# Patient Record
(demographics unavailable — no encounter records)

---

## 2024-11-26 NOTE — THERAPY
[Today's Date] : [unfilled] [Humalog] : Humalog [_____] :  [unfilled] units/hour [BG Target = ____] : BG Target = [unfilled] [Insulin Sensitivity Factor = ____] : Insulin Sensitivity Factor = [unfilled] [FreeTextEntry3] : I:C 12am 15 7am 8 2p 10 [FreeTextEntry2] : PLEASE ADMINISTER INSULIN PREMEAL

## 2024-11-26 NOTE — PAST MEDICAL HISTORY
[At Term] : at term [ Section] : by  section [Age Appropriate] : age appropriate developmental milestones met [de-identified] : transverse lie [FreeTextEntry4] : NICU x2.5 weeks, cyanosis, persistent fetal circulation, intubated, chest tube, collapsed lung

## 2024-11-26 NOTE — PHYSICAL EXAM
[Healthy Appearing] : healthy appearing [Well Nourished] : well nourished [Interactive] : interactive [Normal Appearance] : normal appearance [WNL for age] : within normal limits of age [Normal S1 and S2] : normal S1 and S2 [Clear to Ausculation Bilaterally] : clear to auscultation bilaterally [Abdomen Soft] : soft [Abdomen Tenderness] : non-tender [Normal] : normal  [Mild Lipohypertrophy of Arms] : no mild lipohypertrophy lateral aspects of arms [Goiter] : no goiter [Murmur] : no murmurs [de-identified] : weight gain 7kg/3m,, GV ~8cm/yr [de-identified] : mild acne forehead [de-identified] : eyeglasses [de-identified] : normal oropharynx [de-identified] : normal patellar DTRs

## 2024-11-26 NOTE — DATA REVIEWED
[FreeTextEntry1] : Labs from admission:\par  1/24/23 HbA1C 10.1% CPeptide 1.1 TSH 2.29 AntiTPO 12.5 AntiTG <20 LDL 72 HDL 34 (low) TG 89 IgA 97 TTG IgA <1.2  \par  AntiGAD 0.27 (high) AntiZnT8 <15 ICA 1:64 (high) Insulin Ab 27 (high)

## 2024-11-26 NOTE — DISCUSSION/SUMMARY
[FreeTextEntry1] : Ngozi has T1DM, fairly good control on CGM and insulin pump closed loop.  Higher BGs after meals, then often stays high.  Increased morning/midday I:C and SF, increased all basal rates.  In addition to multiple daily self-monitorings of BG, HgbA1c will be measured about every 3-4 months as an index of chronic glycemic control. TFTs should be measured annually, and celiac profile screened initially, and then repeated if symptoms develop in the future.  Lipid screening begins at age 10-11 year and then 5 yearly if normal.  Within 5 years of diagnosis, she should also have an annual eye examination and urine checked for microalbuminuria. I emphasized the importance of strict glycemic control, while avoiding hypoglycemia, for avoidance of long-term microvascular complications of diabetes. I reviewed the desirable target BG & HgbA1c range and the importance of diet and exercise in determining proper insulin doses. I explained that if the BG is consistently outside the target range, he needs to call the Diabetes Team to make dose adjustments.

## 2024-11-26 NOTE — REVIEW OF SYSTEMS
[Nl] : Neurological [Decrease In Appetite] : decreased appetite [Abdominal Pain] : abdominal pain [Constipation] : constipation [Change in Activity] : no change in activity [Vomiting] : no vomiting [Diarrhea] : no diarrhea [Urinary Frequency] : no urinary frequency

## 2024-11-26 NOTE — CONSULT LETTER
[Dear  ___] : Dear  [unfilled], [Courtesy Letter:] : I had the pleasure of seeing your patient, [unfilled], in my office today. [Please see my note below.] : Please see my note below. [Consult Closing:] : Thank you very much for allowing me to participate in the care of this patient.  If you have any questions, please do not hesitate to contact me. [Sincerely,] : Sincerely, [FreeTextEntry3] : Bertha Patel MD\par  Director, Pediatric Endocrinology\par  [facility], [facility]\par   of Pediatrics \par  [facility] [employer] [facility]

## 2024-11-26 NOTE — HISTORY OF PRESENT ILLNESS
[Other: ___] :  blood sugar levels are tested [unfilled] times per day [Abdomen] : abdomen [Buttocks] : buttocks [Glucagon at Home] : has glucagon at home [Premenarchal] : premenarchal [2-3] : the pump insertion site is changed every 2 - 3 days [Previous Hypoglycemic Seizure] : has no history of hypoglycemic seizure [FreeTextEntry2] : Ngozi is a 10y11m F with T1DM diagnosed 1/2023, no DKA.  Adjusting well.  On dexcom since 2/2023, Omnipod 5 pump closed loop 4/12/23,. Managing her DM with supervision, knows how to do pod changes, not yet dexcom. Started middle school, will no longer have a para.  Mom does note that she has been "sneaking" candy.  Last visit increased all doses.  No intercurrent illness.  Having some nausea, sometimes when wakes up, but sometimes after eats.  Accompanied also by abdominal pain around umbilical area, and gas.  Less constipation. Saw GI, taking pepcid with partial improvement.  Having some anxiety re DM not going away, seeing therapist weekly.    Early normal pubertal development.  More acne.  Acne wash using once daily.  Not yet menarche.  Orthodontist - braces    Podiatry - s/p paronychia.    -Blood Sugar Testing Pattern: on Dexcom G 6 on February 9 and Omnipod 5 on 4/12/2023 -Insulin Given By: patient, state she feels comfortable with carb counting -Missed Shots:  none -Times of Hyperglycemia: mom states patient eats a lot of candy at school and not covering mom states she also is sneaking food at night pt states she goes to sleep at midnight. Stays high after meals; bolusing as she's starting to eating, as per Ngozi she feels nauseous and has a headache with high BS, sometimes will get dizzy.  -Times of Hypoglycemia: improved mom said it's rare but have happened once or twice in the past 3 months; patient states she gets loopy,  hungry, shaky,  treats with 4 oz juice (13-15 carbs) and a few skittles; glucose tabs; as per mom started using activity mode; as per Ngozi she feels dizzy; can't dizzy  -Parental Part in Care: parents have full control of diabetes Management, mom reports that she now puts in pump by herself and learning how to place CGM. She will not be having a para this year. Patient has watch where she can see her BG, patient can count carbs but will ask MOC if she's not sure.  -Recent Hospitalizations: none -Recent Illnesses: stomach issues , mom said they went to Dr. Woods who prescribed pepcid for nausea and stomach aches -Pump Specific: Started on OMNIPOD 5 on 4/12/2023; very pleased with pump -Activity Level: very active in Theatre with dancing; vocal lessons, piano and singing play softball; roller skating; rarely goes low during activity; mom sets to activity mode -Diabetes Education Topics Covered:  reviewed the importance of testing blood sugar; calculation of carb coverage; covering carbs eating; effect of increased physical activity on blood sugar; checking ketones; sick day guidelines; reviewed acute and chronic complications related to diabetes; reviewed the meaning and importance of HgbA1C  - Physician review of data: 2 weeks of CGM and pump download reviewed, 47% time in range, higher after meals anil B/L, and once high midday does not come down sufficiently  OTHER: -Eye exam: 10/24 -Dental: 05/2024, has appt in January 2025 -Podiatry for paronychia 9/2023, doesn't see podiatrist anymore -Labs: 02/03/2024 -Flu Vaccine:  declined, has not yet received pneumococcal - provided information sheet -Covid Vaccine:  declined -PMD: has appt on 12/24  -CDE: 2/9/2023 ;  -Nutrition:  7/11/2023 -Medic Bracelet : wearing bracelet  Current Regimen:  On Omipod 5 as of 4/12/2023:  Basal Rate:  12AM 0.45 units/hour 12PM 0.55 units/hour  ISF: 70 I:C 12AM= 1: 15 I:C= 7:AM=11.5   Target:120 IOB: 3 hours [FreeTextEntry1] : Dexcom G6 on the back of arms; pump on the other arm  No issues with line of site; parents rotate sites

## 2025-02-05 NOTE — HISTORY OF PRESENT ILLNESS
[de-identified] : 10 year old female with Type I DM is here with abdominal pain, nausea and changes in BM. Was seen by [facility] GI and diagnosed with lactose intolerance as per mom. BM is soft and formed but occasionally hard. Denies blood or mucus. Denies emesis but has nausea. Reports to have balanced diet. Denies nocturnal awakenings, unintentional weight loss, rash, joint pain, oral ulcers, vision changes, fever, sick contacts or recent travels.  Did better on pepcid and now weaned off stopped therapy

## 2025-02-25 NOTE — DATA REVIEWED
[FreeTextEntry1] : Labs 11/27/24 CBC nl CMP nl Lipids ok  HbA1C 8.1% LINCOLN/Cr 5 TSH 1.2 T4 6.1 AntiTG <1 AntiTPO 10 IgA 110 TTG IgA<2

## 2025-02-25 NOTE — THERAPY
[Today's Date] : [unfilled] [Humalog] : Humalog [_____] :  [unfilled] units/hour [BG Target = ____] : BG Target = [unfilled] [Insulin Sensitivity Factor = ____] : Insulin Sensitivity Factor = [unfilled] [Insulin on Board (IOB) Duration = ____ hours] : Insulin on Board (IOB) Duration  = [unfilled] hours [FreeTextEntry3] : I:C 12am 15 6am 7 11am 8 2p 10 6p 8 9p 10 [FreeTextEntry2] : PLEASE ADMINISTER INSULIN PREMEAL

## 2025-02-25 NOTE — DISCUSSION/SUMMARY
[FreeTextEntry1] : Ngozi has T1DM, fairl control on CGM and insulin pump closed loop, increased A1C at this time.  Higher insulin requirements likely a reflection of puberty.  Increased basal rates, I:Cs, target, and afternoon SF. Monitoring labs normal.  Additionally increased weight gain.  Referred for nutrition consult.  In addition to multiple daily self-monitorings of BG, HgbA1c will be measured about every 3-4 months as an index of chronic glycemic control. TFTs should be measured annually, and celiac profile screened initially, and then repeated if symptoms develop in the future.  Lipid screening begins at age 10-11 year and then 5 yearly if normal.  Within 5 years of diagnosis, she should also have an annual eye examination and urine checked for microalbuminuria. I emphasized the importance of strict glycemic control, while avoiding hypoglycemia, for avoidance of long-term microvascular complications of diabetes. I reviewed the desirable target BG & HgbA1c range and the importance of diet and exercise in determining proper insulin doses. I explained that if the BG is consistently outside the target range, he needs to call the Diabetes Team to make dose adjustments.

## 2025-02-25 NOTE — HISTORY OF PRESENT ILLNESS
[Other: ___] :  blood sugar levels are tested [unfilled] times per day [2-3] : the pump insertion site is changed every 2 - 3 days [Abdomen] : abdomen [Buttocks] : buttocks [Glucagon at Home] : has glucagon at home [Premenarchal] : premenarchal [Legs] : legs [Previous Hypoglycemic Seizure] : has no history of hypoglycemic seizure [FreeTextEntry2] : Ngozi is a 11y2m F with T1DM diagnosed 1/2023, no DKA.  On dexcom since 2/2023, Omnipod 5 pump closed loop 4/12/23, Managing her DM with supervision, knows how to do pod changes, not yet dexcom on her own. In middle school. No longer eating candy.  Last visit Increased morning/midday I:C and SF, increased all basal rates.  s/p COVID 2 weeks ago.  Currently with a stye. No longer having GI issues.  Main concern at this time is weight gain, increased appetite. Very active.  Having some anxiety re DM not going away, seeing therapist weekly.  Early normal pubertal development.  More acne.  Acne wash using once daily.  Not yet menarche.  Notes increased appetite.  Orthodontist - braces    Podiatry - s/p paronychia.    -Blood Sugar Testing Pattern: Omnipod 5 with Dexcom G6 -Insulin Given By: patient, state she feels comfortable with carb counting -Missed Shots:  none -Times of Hyperglycemia: mom states patient eats sometimes without her knowledge.  Stays high after meals sometimes and is trying for most part to bolus before meals or at the start of meals; as per Ngozi she feels nauseous and has a headache with high BS, sometimes will get dizzy. Gained 15 lbs since last visit in November.  -Times of Hypoglycemia: not often; patient states she gets loopy, hungry, shaky, treats with 4 oz juice (13-15 carbs) and a few skittles; glucose tabs; as per mom started using activity mode; as per Ngozi she feels dizzy; can't dizzy  -Parental Part in Care: parents have full control of diabetes Management, mom reports that she now puts in pump by herself and learning how to place CGM. She will not be having a para this year. Patient has watch where she can see her BG, patient can count carbs but will ask MOC if she's not sure. Patient is gaining independence.  -Recent Hospitalizations: none -Recent Illnesses: COVID two weeks ago; slight cold; stye in right eye; mom and patient report that stomach issues have resolved.  -Pump Specific: Started on OMNIPOD 5 on 4/12/2023; very pleased with pump; changing sites every 2 days; insulin in pump humalog; uses Activity Mode if very active, swimming.  -Activity Level: very active in Theatre with dancing; vocal lessons, piano and singing play softball; archery, dancing, roller skating; rarely goes low during activity; mom sets to activity mode; -Diabetes Education Topics Covered:  reviewed the importance of testing blood sugar; calculation of carb coverage; covering carbs eating; effect of increased physical activity on blood sugar; checking ketones; sick day guidelines; reviewed acute and chronic complications related to diabetes; reviewed the meaning and importance of HgbA1C  - Physician review of data: 2 weeks of CGM and pump download reviewed, 53% time in range, higher after meals anil B/D, once high midday does not come down sufficiently  OTHER: -Eye exam: 10/24-prescription changed -Dental: 05/2024, needs cleaning due now -Podiatry for paronychia 9/2023, doesn't see podiatrist anymore -Labs:02/2025 -Flu Vaccine:  declined, has not yet received pneumococcal - provided information sheet -Covid Vaccine:  declined -PMD: had appt on 12/24  -CDE: 2/9/2023 ;  -Nutrition:  7/11/2023 -Medic Bracelet: wearing bracelet  Current Regimen:  On Omipod 5 as of 4/12/2023:  Basal Rate:  12AM 0.9 units/hour 12PM 1.0 units/hour  ISF: 12am=60 7am=55 7pm=60 I:C 12AM= 1: 15; 7am=8; 2pm=10  Target:120 (correction threshold 130) IOB: 3 hours [FreeTextEntry1] : Dexcom G6 on the back of arms; pump on the other arm  No issues with line of site; parents rotate sites

## 2025-02-25 NOTE — PHYSICAL EXAM
[Healthy Appearing] : healthy appearing [Well Nourished] : well nourished [Interactive] : interactive [WNL for age] : within normal limits of age [Normal S1 and S2] : normal S1 and S2 [Clear to Ausculation Bilaterally] : clear to auscultation bilaterally [Abdomen Soft] : soft [Abdomen Tenderness] : non-tender [Normal] : normal  [Overweight] : overweight [Mild Lipohypertrophy of Arms] : no mild lipohypertrophy lateral aspects of arms [Goiter] : no goiter [Murmur] : no murmurs [de-identified] : weight gain 6.7kg/3m, GV 8.4cm/yr [de-identified] : minimal acne forehead [de-identified] : eyeglasses, stye R upper lid laterally [de-identified] : normal oropharynx [de-identified] : normal patellar DTRs

## 2025-02-25 NOTE — CONSULT LETTER
[Dear  ___] : Dear  [unfilled], [Courtesy Letter:] : I had the pleasure of seeing your patient, [unfilled], in my office today. [Please see my note below.] : Please see my note below. [Consult Closing:] : Thank you very much for allowing me to participate in the care of this patient.  If you have any questions, please do not hesitate to contact me. [Sincerely,] : Sincerely, [FreeTextEntry3] : Bertha Patel MD\par  Director, Pediatric Endocrinology\par  [facility], [facility]\par   of Pediatrics \par  [facility] [employer] at [facility]

## 2025-02-25 NOTE — PAST MEDICAL HISTORY
[At Term] : at term [ Section] : by  section [Age Appropriate] : age appropriate developmental milestones met [de-identified] : transverse lie [FreeTextEntry4] : NICU x2.5 weeks, cyanosis, persistent fetal circulation, intubated, chest tube, collapsed lung

## 2025-03-26 NOTE — PHYSICAL EXAM
[Healthy Appearing] : healthy appearing [Well Nourished] : well nourished [Interactive] : interactive [Overweight] : overweight [WNL for age] : within normal limits of age [Normal S1 and S2] : normal S1 and S2 [Murmur] : no murmurs [Clear to Ausculation Bilaterally] : clear to auscultation bilaterally [Abdomen Soft] : soft [Abdomen Tenderness] : non-tender [Normal] : normal  [de-identified] : normal oropharynx [Normal Appearance] : normal appearance [Mild Lipohypertrophy of Arms] : no mild lipohypertrophy lateral aspects of arms [Goiter] : no goiter [de-identified] : weight gain 2.6kg/1m [de-identified] : minimal acne forehead [de-identified] : eyeglasses [de-identified] : nonfocal

## 2025-03-26 NOTE — PAST MEDICAL HISTORY
[At Term] : at term [ Section] : by  section [Age Appropriate] : age appropriate developmental milestones met [de-identified] : transverse lie [FreeTextEntry4] : NICU x2.5 weeks, cyanosis, persistent fetal circulation, intubated, chest tube, collapsed lung

## 2025-03-26 NOTE — CONSULT LETTER
[Dear  ___] : Dear  [unfilled], [Courtesy Letter:] : I had the pleasure of seeing your patient, [unfilled], in my office today. [Please see my note below.] : Please see my note below. [Consult Closing:] : Thank you very much for allowing me to participate in the care of this patient.  If you have any questions, please do not hesitate to contact me. [Sincerely,] : Sincerely, [FreeTextEntry3] : Bertha Patel MD\par  Director, Pediatric Endocrinology\par  [facility], [facility]\par   of Pediatrics \par  [facility] [employer] at [facility]

## 2025-03-26 NOTE — PHYSICAL EXAM
[Healthy Appearing] : healthy appearing [Well Nourished] : well nourished [Interactive] : interactive [Overweight] : overweight [WNL for age] : within normal limits of age [Normal S1 and S2] : normal S1 and S2 [Murmur] : no murmurs [Clear to Ausculation Bilaterally] : clear to auscultation bilaterally [Abdomen Soft] : soft [Abdomen Tenderness] : non-tender [Normal] : normal  [de-identified] : normal oropharynx [Normal Appearance] : normal appearance [Mild Lipohypertrophy of Arms] : no mild lipohypertrophy lateral aspects of arms [Goiter] : no goiter [de-identified] : weight gain 2.6kg/1m [de-identified] : minimal acne forehead [de-identified] : eyeglasses [de-identified] : nonfocal

## 2025-03-26 NOTE — HISTORY OF PRESENT ILLNESS
[Other: ___] :  blood sugar levels are tested [unfilled] times per day [2-3] : the pump insertion site is changed every 2 - 3 days [Legs] : legs [Abdomen] : abdomen [Buttocks] : buttocks [Glucagon at Home] : has glucagon at home [Premenarchal] : premenarchal [Previous Hypoglycemic Seizure] : has no history of hypoglycemic seizure [FreeTextEntry2] : Ngozi is a 11y3m F with T1DM diagnosed 1/2023, no DKA.  On dexcom since 2/2023, Omnipod 5 pump closed loop 4/12/23, managing her DM with supervision, knows how to do pod changes, not yet dexcom on her own. In middle school. No longer eating candy but admits to routinely. Main concern is weight gain, increased appetite. Very active.  Last visit increased basal rates, I:Cs, target, and afternoon SF. Returns today to meet with nutritionist.  Having some anxiety re DM, seeing therapist weekly.  Early normal pubertal development.  More acne.  Acne wash using once daily.  Not yet menarche.  Notes increased appetite.  Orthodontist - braces    Podiatry - s/p paronychia.    -Blood Sugar Testing Pattern: Omnipod 5 with Dexcom G6 -Insulin Given By: patient, state she feels comfortable with carb counting -Missed Shots:  none -Times of Hyperglycemia: mom states patient eats sometimes without her knowledge.  Stays high after meals sometimes and is trying for most part to bolus before meals or at the start of meals; as per Ngozi she feels nauseous and has a headache with high BS, sometimes will get dizzy. Gained 15 lbs since last visit in November.  -Times of Hypoglycemia: not often; patient states she gets loopy, hungry, shaky, treats with 4 oz juice (13-15 carbs) and a few skittles; glucose tabs; as per mom started using activity mode; as per Ngozi she feels dizzy; can't dizzy  -Parental Part in Care: parents have full control of diabetes Management, mom reports that she now puts in pump by herself and learning how to place CGM. She will not be having a para this year. Patient has watch where she can see her BG, patient can count carbs but will ask MOC if she's not sure. Patient is gaining independence.  -Recent Hospitalizations: none -Recent Illnesses: COVID two weeks ago; slight cold; stye in right eye; mom and patient report that stomach issues have resolved.  -Pump Specific: Started on OMNIPOD 5 on 4/12/2023; very pleased with pump; changing sites every 2 days; insulin in pump humalog; uses Activity Mode if very active, swimming.  -Activity Level: very active in Theatre with dancing; vocal lessons, piano and singing play softball; archery, dancing, roller skating; rarely goes low during activity; mom sets to activity mode; -Diabetes Education Topics Covered:  reviewed the importance of testing blood sugar; calculation of carb coverage; covering carbs eating; effect of increased physical activity on blood sugar; checking ketones; sick day guidelines; reviewed acute and chronic complications related to diabetes; reviewed the meaning and importance of HgbA1C  - Physician review of data: 2 weeks of CGM and pump download reviewed, 53% time in range, higher after meals anil B/D, once high midday does not come down sufficiently  OTHER: -Eye exam: 10/24-prescription changed -Dental: 05/2024, needs cleaning due now -Podiatry for paronychia 9/2023, doesn't see podiatrist anymore -Labs:02/2025 -Flu Vaccine:  declined, has not yet received pneumococcal - provided information sheet -Covid Vaccine:  declined -PMD: had appt on 12/24  -CDE: 2/9/2023 ;  -Nutrition:  today 3/25/25 -Medic Bracelet: wearing bracelet  Current Regimen:  On Omipod 5 as of 4/12/2023:  Basal Rate:  12AM 0.9 units/hour 12PM 1.0 units/hour  ISF: 12am=60 7am=55 7pm=60 I:C 12AM= 1: 15; 7am=8; 2pm=10  Target:120 (correction threshold 130) IOB: 3 hours [FreeTextEntry1] : Dexcom G6 on the back of arms; pump on the other arm  No issues with line of site; parents rotate sites

## 2025-03-26 NOTE — PAST MEDICAL HISTORY
[At Term] : at term [ Section] : by  section [Age Appropriate] : age appropriate developmental milestones met [de-identified] : transverse lie [FreeTextEntry4] : NICU x2.5 weeks, cyanosis, persistent fetal circulation, intubated, chest tube, collapsed lung

## 2025-03-26 NOTE — CONSULT LETTER
[Dear  ___] : Dear  [unfilled], [Courtesy Letter:] : I had the pleasure of seeing your patient, [unfilled], in my office today. [Please see my note below.] : Please see my note below. [Consult Closing:] : Thank you very much for allowing me to participate in the care of this patient.  If you have any questions, please do not hesitate to contact me. [Sincerely,] : Sincerely, [FreeTextEntry3] : Bertha Patel MD\par  Director, Pediatric Endocrinology\par  [facility], [facility]\par   of Pediatrics \par  [facility] [employer] [facility] at [facility]

## 2025-03-26 NOTE — DISCUSSION/SUMMARY
[FreeTextEntry1] : Ngozi has T1DM, fair control on CGM and insulin pump closed loop, increased A1C at visit 1m ago.  Higher insulin requirements likely a reflection of puberty. Additionally having sweetened drinks which likely contribute to highs, to be avoided.    Additionally increased weight gain.  Dietary recommendations provided, see nutrition note.  In addition to multiple daily self-monitorings of BG, HgbA1c will be measured about every 3-4 months as an index of chronic glycemic control. TFTs should be measured annually, and celiac profile screened initially, and then repeated if symptoms develop in the future.  Lipid screening begins at age 10-11 year and then 5 yearly if normal.  Within 5 years of diagnosis, she should also have an annual eye examination and urine checked for microalbuminuria. I emphasized the importance of strict glycemic control, while avoiding hypoglycemia, for avoidance of long-term microvascular complications of diabetes. I reviewed the desirable target BG & HgbA1c range and the importance of diet and exercise in determining proper insulin doses. I explained that if the BG is consistently outside the target range, he needs to call the Diabetes Team to make dose adjustments.

## 2025-05-20 NOTE — PHYSICAL EXAM
[Healthy Appearing] : healthy appearing [Well Nourished] : well nourished [Interactive] : interactive [Overweight] : overweight [Normal Appearance] : normal appearance [WNL for age] : within normal limits of age [Normal] : normal  [Normal S1 and S2] : normal S1 and S2 [Clear to Ausculation Bilaterally] : clear to auscultation bilaterally [Abdomen Soft] : soft [Abdomen Tenderness] : non-tender [Mild Lipohypertrophy of Arms] : no mild lipohypertrophy lateral aspects of arms [Goiter] : no goiter [Murmur] : no murmurs [de-identified] : weight gain 2 2.1kg/2m [de-identified] : mild-moderate acne forehead [de-identified] : eyeglasses [de-identified] : nonfocal

## 2025-05-20 NOTE — PAST MEDICAL HISTORY
[At Term] : at term [ Section] : by  section [Age Appropriate] : age appropriate developmental milestones met [de-identified] : transverse lie [FreeTextEntry4] : NICU x2.5 weeks, cyanosis, persistent fetal circulation, intubated, chest tube, collapsed lung

## 2025-05-20 NOTE — PHYSICAL EXAM
[Healthy Appearing] : healthy appearing [Well Nourished] : well nourished [Interactive] : interactive [Overweight] : overweight [Normal Appearance] : normal appearance [WNL for age] : within normal limits of age [Normal] : normal  [Normal S1 and S2] : normal S1 and S2 [Clear to Ausculation Bilaterally] : clear to auscultation bilaterally [Abdomen Soft] : soft [Abdomen Tenderness] : non-tender [Mild Lipohypertrophy of Arms] : no mild lipohypertrophy lateral aspects of arms [Goiter] : no goiter [Murmur] : no murmurs [de-identified] : weight gain 2 2.1kg/2m [de-identified] : mild-moderate acne forehead [de-identified] : eyeglasses [de-identified] : nonfocal

## 2025-05-20 NOTE — PAST MEDICAL HISTORY
[At Term] : at term [ Section] : by  section [Age Appropriate] : age appropriate developmental milestones met [de-identified] : transverse lie [FreeTextEntry4] : NICU x2.5 weeks, cyanosis, persistent fetal circulation, intubated, chest tube, collapsed lung

## 2025-05-20 NOTE — CONSULT LETTER
[Dear  ___] : Dear  [unfilled], [Courtesy Letter:] : I had the pleasure of seeing your patient, [unfilled], in my office today. [Please see my note below.] : Please see my note below. [Consult Closing:] : Thank you very much for allowing me to participate in the care of this patient.  If you have any questions, please do not hesitate to contact me. [Sincerely,] : Sincerely, [FreeTextEntry3] : Bertha Patel MD\par  Director, Pediatric Endocrinology\par  [facility], [facility]\par   of Pediatrics \par  [facility] [employer] at [facility]

## 2025-05-20 NOTE — THERAPY
[Today's Date] : [unfilled] [Humalog] : Humalog [_____] :  [unfilled] units/hour [BG Target = ____] : BG Target = [unfilled] [Insulin Sensitivity Factor = ____] : Insulin Sensitivity Factor = [unfilled] [Insulin on Board (IOB) Duration = ____ hours] : Insulin on Board (IOB) Duration  = [unfilled] hours [FreeTextEntry3] : I:C 12am 15 6am 7 11am 8 2p 9 6p 8 9p 9 [FreeTextEntry2] : PLEASE ADMINISTER INSULIN PREMEAL

## 2025-05-20 NOTE — HISTORY OF PRESENT ILLNESS
[Other: ___] :  blood sugar levels are tested [unfilled] times per day [2-3] : the pump insertion site is changed every 2 - 3 days [Legs] : legs [Abdomen] : abdomen [Buttocks] : buttocks [Glucagon at Home] : has glucagon at home [Premenarchal] : premenarchal [Previous Hypoglycemic Seizure] : has no history of hypoglycemic seizure [FreeTextEntry2] : Ngozi is a 11y6m F with T1DM diagnosed 1/2023, no DKA.  On dexcom since 2/2023, Omnipod 5 pump closed loop 4/12/23, managing her DM with supervision, knows how to do pod changes, not yet dexcom on her own. In middle school. Also concern is weight gain, increased appetite. Very active.  Last visit increased basal rates, I:Cs, target, and afternoon SF. No recent illness.  Having some anxiety re DM, seeing therapist weekly.  Early normal pubertal development.  Worsening acne.  Acne wash using twice daily.  At night using benzoyl peroxide with some improvement. Not yet menarche.  Notes increased appetite.  Orthodontist - braces    Diet - cut down on sugary drinks - now 1x/wk, stopped buying sweets, bolusing more consistently Wxercise -  dance 3x  -Blood Sugar Testing Pattern: Omnipod 5 with Dexcom G6; uses freestyle lite meter -Insulin Given By: patient, state she feels comfortable with carb counting; mom -Missed Shots:  none -Times of Hyperglycemia: Patient reports that she has been bolusing for foods she is eaten.  Stays high after meals sometimes and is trying for most part to bolus before meals or at the start of meals; as per Ngozi she feels nauseous and has a headache with high BS, sometimes will get dizzy. Gained approximately 4 lbs.  -Times of Hypoglycemia: not often; patient states she gets loopy, hungry, shaky, treats with 4 oz juice (13-15 carbs) and a few skittles; glucose tabs; as per mom started using activity mode; as per Ngozi she feels dizzy;  -Parental Part in Care: parents have full control of diabetes Management, mom reports that she now puts in pump by herself and learning how to place CGM. She will not be having a para this year. Patient has watch where she can see her BG, patient can count carbs but will ask MOC if she's not sure. Patient is gaining independence.  -Recent Hospitalizations: none -Recent Illnesses: none -Pump Specific: Started on OMNIPOD 5 on 4/12/2023; very pleased with pump; changing sites every 2 days; insulin in pump humalog; uses Activity Mode if very active, swimming.  -Activity Level: very active in Theatre with dancing; vocal lessons, piano and singing play softball; archery, dancing, roller skating; rarely goes low during activity; mom sets to activity mode; -Diabetes Education Topics Covered:  reviewed the importance of testing blood sugar; calculation of carb coverage; covering carbs eating; effect of increased physical activity on blood sugar; checking ketones; sick day guidelines; reviewed acute and chronic complications related to diabetes; reviewed the meaning and importance of HgbA1C  - Physician review of data: 2 weeks of CGM and pump download reviewed, 56% time in range, higher after afternoon and nighttime snacks, once high does not come down sufficiently  OTHER: -Eye exam: 10/24-prescription changed -Dental: 05/2024,dentist 05/2025 -Podiatry for paronychia 9/2023, doesn't see podiatrist anymore -Labs:02/2025 -Flu Vaccine:  declined, has not yet received pneumococcal - provided information sheet -Covid Vaccine:  declined -PMD: had appt on 12/24  -CDE: 2/9/2023 ;  -Nutrition:  3/25/25 -Medic Bracelet: wearing bracelet  Current Regimen:  On Omipod 5 as of 4/12/2023:  Basal Rate:  12AM 1 units/hour 12PM 1.1 units/hour  ISF: 12am=60 7am=55 5pm=50 7pm=60 I:C 12AM= 1: 15; 6am=1:7; 11am=1:8; 2pm=1:10; 6pm=1:8; 9pm=1:10  Target: 110 (correct 120) IOB: 3 hours [FreeTextEntry1] : Dexcom G6 on the back of arms; pump on the other arm  No issues with line of site; parents rotate sites

## 2025-05-20 NOTE — REASON FOR VISIT
[Follow-Up: _____] : a [unfilled] follow-up visit  [Patient] : patient [Mother] : mother [Family Member] : family member

## 2025-05-20 NOTE — DISCUSSION/SUMMARY
[FreeTextEntry1] : Ngozi has T1DM, good control on CGM and insulin pump closed loop, improved A1C.  Higher insulin requirements likely a reflection of puberty. Increased daytime SF and afternoon and pm I:C.  Additionally increased weight gain.  Following with nutritionist.  In addition to multiple daily self-monitorings of BG, HgbA1c will be measured about every 3-4 months as an index of chronic glycemic control. TFTs should be measured annually, and celiac profile screened initially, and then repeated if symptoms develop in the future.  Lipid screening begins at age 10-11 year and then 5 yearly if normal.  Within 5 years of diagnosis, she should also have an annual eye examination and urine checked for microalbuminuria. I emphasized the importance of strict glycemic control, while avoiding hypoglycemia, for avoidance of long-term microvascular complications of diabetes. I reviewed the desirable target BG & HgbA1c range and the importance of diet and exercise in determining proper insulin doses. I explained that if the BG is consistently outside the target range, he needs to call the Diabetes Team to make dose adjustments.

## 2025-05-20 NOTE — CONSULT LETTER
[Dear  ___] : Dear  [unfilled], [Courtesy Letter:] : I had the pleasure of seeing your patient, [unfilled], in my office today. [Please see my note below.] : Please see my note below. [Consult Closing:] : Thank you very much for allowing me to participate in the care of this patient.  If you have any questions, please do not hesitate to contact me. [Sincerely,] : Sincerely, [FreeTextEntry3] : Bertha Patel MD\par  Director, Pediatric Endocrinology\par  [facility], [facility]\par   of Pediatrics \par  [facility] [employer] [facility]